# Patient Record
Sex: FEMALE | URBAN - METROPOLITAN AREA
[De-identification: names, ages, dates, MRNs, and addresses within clinical notes are randomized per-mention and may not be internally consistent; named-entity substitution may affect disease eponyms.]

---

## 2020-11-18 ENCOUNTER — APPOINTMENT (RX ONLY)
Dept: URBAN - METROPOLITAN AREA CLINIC 12 | Facility: CLINIC | Age: 70
Setting detail: DERMATOLOGY
End: 2020-11-18

## 2020-11-18 DIAGNOSIS — Z41.1 ENCOUNTER FOR COSMETIC SURGERY: ICD-10-CM

## 2020-11-18 PROBLEM — C43.39 MALIGNANT MELANOMA OF OTHER PARTS OF FACE: Status: ACTIVE | Noted: 2020-11-18

## 2020-11-18 PROCEDURE — 99203 OFFICE O/P NEW LOW 30 MIN: CPT

## 2020-11-18 PROCEDURE — ? PATIENT SPECIFIC COUNSELING

## 2020-11-18 PROCEDURE — ? PRE-OP WORKLIST

## 2020-11-18 ASSESSMENT — LOCATION ZONE DERM: LOCATION ZONE: FACE

## 2020-11-18 ASSESSMENT — LOCATION SIMPLE DESCRIPTION DERM: LOCATION SIMPLE: RIGHT CHEEK

## 2020-11-18 ASSESSMENT — LOCATION DETAILED DESCRIPTION DERM: LOCATION DETAILED: RIGHT CENTRAL MALAR CHEEK

## 2020-11-18 NOTE — PROCEDURE: PRE-OP WORKLIST
Detail Level: Simple
Surgery Scheduled: N/A
Date Of Evaluation: 11/18/2020
Photos Taken?: yes
Surgeon: Dr. Quinones

## 2020-11-18 NOTE — PROCEDURE: PATIENT SPECIFIC COUNSELING
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 Patient reports history of basal cell cancer on face. Patient states she will be visiting her pcp this Friday to inform them of the surgery planned. Patient reports last A1c was taken 3 months ago: 8.1 .  Dr. Quinones examined patient and explained how he would Close her right cheek, Dr. Quinones will be coordinating with Dr. Harris, Informed patient that it may take 3-4 weeks delayed healing due to diabetes. Patient verbalized understanding, consent was signed and photos were obtained. Patient was prescribed Doxycycline and Tylenol #3 and advised to not take meds until after surgery. Patient advised to RTC for postop.
Detail Level: Simple

## 2020-12-14 ENCOUNTER — APPOINTMENT (RX ONLY)
Dept: URBAN - METROPOLITAN AREA CLINIC 12 | Facility: CLINIC | Age: 70
Setting detail: DERMATOLOGY
End: 2020-12-14

## 2020-12-14 DIAGNOSIS — Z48.89 ENCOUNTER FOR OTHER SPECIFIED SURGICAL AFTERCARE: ICD-10-CM

## 2020-12-14 PROCEDURE — 99024 POSTOP FOLLOW-UP VISIT: CPT

## 2020-12-14 PROCEDURE — ? COUNSELING - POST-OP CHECK

## 2020-12-14 PROCEDURE — ? PATIENT SPECIFIC COUNSELING

## 2020-12-14 PROCEDURE — ? SUTURE REMOVAL

## 2020-12-14 PROCEDURE — ? POST-OP CHECK

## 2020-12-14 ASSESSMENT — LOCATION ZONE DERM: LOCATION ZONE: FACE

## 2020-12-14 ASSESSMENT — LOCATION SIMPLE DESCRIPTION DERM: LOCATION SIMPLE: RIGHT CHEEK

## 2020-12-14 ASSESSMENT — LOCATION DETAILED DESCRIPTION DERM: LOCATION DETAILED: RIGHT CENTRAL MALAR CHEEK

## 2020-12-14 NOTE — PROCEDURE: POST-OP CHECK
Detail Level: Detailed
Add Postop Global No-Charge Code (88994)?: yes
Wound Evaluated By: Dr. Quinones

## 2020-12-14 NOTE — PROCEDURE: PATIENT SPECIFIC COUNSELING
Other (Free Text): Patient presents s/p right cheek reconstruction-performed 11/30/2020. Patient reports doing well with no concerns. Sutures were removed without complication. Advised to follow up in as needed.
Detail Level: Zone